# Patient Record
Sex: MALE | Race: BLACK OR AFRICAN AMERICAN | NOT HISPANIC OR LATINO | Employment: FULL TIME | URBAN - METROPOLITAN AREA
[De-identification: names, ages, dates, MRNs, and addresses within clinical notes are randomized per-mention and may not be internally consistent; named-entity substitution may affect disease eponyms.]

---

## 2017-03-21 ENCOUNTER — OFFICE VISIT (OUTPATIENT)
Dept: FAMILY MEDICINE | Facility: CLINIC | Age: 47
End: 2017-03-21
Payer: COMMERCIAL

## 2017-03-21 VITALS
DIASTOLIC BLOOD PRESSURE: 78 MMHG | HEIGHT: 71 IN | HEART RATE: 80 BPM | WEIGHT: 257.69 LBS | BODY MASS INDEX: 36.08 KG/M2 | TEMPERATURE: 98 F | SYSTOLIC BLOOD PRESSURE: 130 MMHG

## 2017-03-21 DIAGNOSIS — Z00.00 ANNUAL PHYSICAL EXAM: Primary | ICD-10-CM

## 2017-03-21 DIAGNOSIS — R10.32 LEFT GROIN PAIN: ICD-10-CM

## 2017-03-21 DIAGNOSIS — Z72.0 TOBACCO USE: ICD-10-CM

## 2017-03-21 DIAGNOSIS — E66.9 OBESITY (BMI 30-39.9): ICD-10-CM

## 2017-03-21 PROCEDURE — 99386 PREV VISIT NEW AGE 40-64: CPT | Mod: S$GLB,,, | Performed by: INTERNAL MEDICINE

## 2017-03-21 PROCEDURE — 99999 PR PBB SHADOW E&M-EST. PATIENT-LVL III: CPT | Mod: PBBFAC,,, | Performed by: INTERNAL MEDICINE

## 2017-03-21 NOTE — PROGRESS NOTES
Subjective:        Patient ID: Sami Benson is a 46 y.o. male.    Chief Complaint: Annual Exam    HPI   Sami Benson presents for annual exam and to establish care.  Pt had labs drawn at an [facility] ~1 month ago; he thinks he has copies of the results at home.    1. L groin/upper leg pain: Pt reports pain in the L groin, upper leg x 6 months.  Pt's last PCP had ordered x-rays, MRI and US at [facility].  Pt reports pain is worse with certain movements (like swinging legs out to get out of the car) so he has learned not to move certain ways.  Pain is also worse at night and it wakes him up from sleep.  Pt endorses leg weakness 2/2 pain.  He feels he cannot put full body weight on that leg due pain and unsteadiness.  Pt denies low back pain, numbness and tingling in the leg.  He denies h/o trauma or fall preceding the onset of pain.  Pt says he was supposed to get a CT scan next but cannot afford the test at this time.  He and his last PCP also discussed possibly seeing a neurologist.    2. Tobacco use: pt smokes 1ppd and is interested in quitting.  He started using a hypnosis jay on his phone 2 weeks ago and reports today he felt like he didn't need to smoke anymore and threw away his cigarettes.  Pt is still interested in the tobacco cessation program.  He has tried quitting in the past using nicotine patches.    Review of Systems   Constitutional: Negative for activity change and unexpected weight change.   HENT: Negative for ear pain, hearing loss, sore throat and trouble swallowing.    Eyes: Positive for visual disturbance (has glasses, doesn't always wear them).   Respiratory: Negative for chest tightness and shortness of breath.    Cardiovascular: Negative for chest pain and leg swelling.   Gastrointestinal: Negative for abdominal pain, blood in stool, constipation and diarrhea.   Genitourinary: Negative for difficulty urinating and hematuria.   Skin: Negative for rash.   Neurological:  Negative for dizziness and light-headedness.   Psychiatric/Behavioral: Negative for dysphoric mood. The patient is not nervous/anxious.            Objective:        Vitals:    03/21/17 0806   BP: 130/78   Pulse: 80   Temp: 98 °F (36.7 °C)     Physical Exam   Constitutional: He is oriented to person, place, and time. He appears well-developed and well-nourished. No distress.   HENT:   Head: Normocephalic and atraumatic.   Right Ear: External ear normal.   Left Ear: External ear normal.   Nose: Nose normal.   Mouth/Throat: Oropharynx is clear and moist.   - bilateral ear canals clear, tympanic membranes visualized - normal color and light reflex   Eyes: Conjunctivae and EOM are normal.   Neck: Normal range of motion. Neck supple.   Cardiovascular: Normal rate, regular rhythm and normal heart sounds.    Pulmonary/Chest: Effort normal and breath sounds normal. No respiratory distress.   Abdominal: Soft. He exhibits no distension. There is no tenderness. There is no guarding.   Musculoskeletal: Normal range of motion. He exhibits no edema or deformity.   Neurological: He is alert and oriented to person, place, and time. He exhibits normal muscle tone. Coordination normal.   - 5/5 strength in b/l LEs  - gait WNL  - pt unsteady standing on L and R feet separately but equal   Skin: Skin is warm and dry.   Psychiatric: He has a normal mood and affect. His behavior is normal. Judgment and thought content normal.   Vitals reviewed.          Assessment:         1. Annual physical exam    2. Tobacco use    3. Left groin pain    4. Obesity (BMI 30-39.9)              Plan:         Sami was seen today for annual exam.    Diagnoses and all orders for this visit:    Annual physical exam  - Will obtain medical records from [facility] for records of imaging and vaccinations  - No famhx of early prostate or colon Rashi  - Pt had screening labs 1 month at health [facility]; asked pt to please drop off a copy of these results when he finds  them.    Tobacco use: Referral to smoking cessation  -     Ambulatory referral to Smoking Cessation Program    Left groin pain: Unclear etiology but likely MSK, possibly bursitis, hip arthritis, tendonitis.  Strength intact on exam and sx not c/w neuropathic etiology.  Will review imaging that has already been completed by previous PCP before ordering further testing.    Obesity (BMI 30-39.9): Stable.        Will contact pt regarding follow up after reviewing outside labs and medical records.

## 2017-03-21 NOTE — MR AVS SNAPSHOT
"    [facility]  [address] [facility], [address]  [place] [address]  Phone: [phone]  Fax: [phone]                  Sami Benson   3/21/2017 8:00 AM   Office Visit    Description:  Male : 1970   Provider:  Cecilia Cuevas MD   Department:  [facility]           Reason for Visit     Annual Exam           Diagnoses this Visit        Comments    Annual physical exam    -  Primary     Tobacco use                To Do List           Goals (5 Years of Data)     None      Follow-Up and Disposition     Return for You will be contacted once we receive your outside medical records and lab results..      [facility] On Call     [facility] On Call Nurse Care Line -  Assistance  Registered nurses in the [facility] On Call Center provide clinical advisement, health education, appointment booking, and other advisory services.  Call for this free service at [phone].             Medications           Message regarding Medications     Verify the changes and/or additions to your medication regime listed below are the same as discussed with your clinician today.  If any of these changes or additions are incorrect, please notify your healthcare provider.        STOP taking these medications     OSELTAMIVIR PHOSPHATE (TAMIFLU ORAL) Take by mouth.           Verify that the below list of medications is an accurate representation of the medications you are currently taking.  If none reported, the list may be blank. If incorrect, please contact your healthcare provider. Carry this list with you in case of emergency.           Current Medications            Clinical Reference Information           Your Vitals Were     BP Pulse Temp Height Weight BMI    130/78 80 98 °F (36.7 °C) (Oral) 5' 11" (1.803 m) 116.9 kg (257 lb 11.5 oz) 35.94 kg/m2      Blood Pressure          Most Recent Value    BP  130/78      Allergies as of 3/21/2017     No Known Allergies      Immunizations " Administered on Date of Encounter - 3/21/2017     None      Orders Placed During Today's Visit      Normal Orders This Visit    Ambulatory referral to Smoking Cessation Program       Smoking Cessation     If you would like to quit smoking:   You may be eligible for free services if you are a [place] resident and started smoking cigarettes before September 1, 1988.  Call the Smoking [employer]essation [employer] ([employer]) toll free at (260) 390-3401 or (147) 785-2135.   Call 8-800-QUIT-NOW if you do not meet the above criteria.            Language Assistance Services     ATTENTION: Language assistance services are available, free of charge. Please call [phone].      ATENCIÓN: Si habla español, tiene a finley disposición servicios gratuitos de asistencia lingüística. Llame al [phone].     CHÚ Ý: N?u b?n nói Ti?ng Vi?t, có các d?ch v? h? tr? ngôn ng? mi?n phí dành cho b?n. G?i s? [phone].         [facility] complies with applicable Federal civil rights laws and does not discriminate on the basis of race, color, national origin, age, disability, or sex.

## 2017-04-19 ENCOUNTER — TELEPHONE (OUTPATIENT)
Dept: FAMILY MEDICINE | Facility: CLINIC | Age: 47
End: 2017-04-19

## 2017-04-19 NOTE — TELEPHONE ENCOUNTER
Notified patient that records have not been received and that request was resent today. Informed patient that he might want to contact them to get his own records.patient verbalizes understanding.

## 2017-04-19 NOTE — TELEPHONE ENCOUNTER
----- Message from Bina Davila sent at 4/19/2017  9:56 AM CDT -----  Contact: pt [phone]  Pt was inquiring if Dr. Cuevas had a chance to go over his Xrays and MRI. pls advise pt

## 2017-05-09 ENCOUNTER — TELEPHONE (OUTPATIENT)
Dept: FAMILY MEDICINE | Facility: CLINIC | Age: 47
End: 2017-05-09

## 2017-05-09 NOTE — TELEPHONE ENCOUNTER
----- Message from Rose Lacy sent at 5/9/2017  9:48 AM CDT -----  Contact: Self/ [phone]   Type: Test Results    What test was performed? MRI     Who ordered the test?    When and where were the test performed?     Comments: pt want to know if the doctor receive his MRI and Xray from the [facility]. Please call and advise     Thank you

## 2017-05-19 ENCOUNTER — TELEPHONE (OUTPATIENT)
Dept: FAMILY MEDICINE | Facility: CLINIC | Age: 47
End: 2017-05-19

## 2017-05-19 NOTE — TELEPHONE ENCOUNTER
----- Message from Kaylyn Sandra sent at 5/19/2017  3:07 PM CDT -----  Contact: Patient, phone [phone]  The patient would like to know if the records have come from [facility].  If not he would like to know if he should take the MRI and x-ray again.  Please give him a call to advise.     Thanks!

## 2017-05-25 ENCOUNTER — TELEPHONE (OUTPATIENT)
Dept: FAMILY MEDICINE | Facility: CLINIC | Age: 47
End: 2017-05-25

## 2017-05-25 DIAGNOSIS — M25.552 PAIN OF LEFT HIP JOINT: Primary | ICD-10-CM

## 2017-05-25 NOTE — TELEPHONE ENCOUNTER
----- Message from Naomi Perkins sent at 5/25/2017 12:43 PM CDT -----  Contact: call pt [phone]  Patient is calling to see if received copies of his x rays and cat scan that were done, he states that he is in a lot of pain and would to speak with you about those results.   I offered to make an appointment for patient to come in to see you he did not want to do that at this time

## 2017-05-25 NOTE — TELEPHONE ENCOUNTER
Called and spoke with patient.  Reviewed [facility] and [facility] records.  Pt had US for L groin/pelvic pain that showed no hernia.  Per physician notes, MRI and ortho referral were recommended next.  Pt did not get MRI due to cost.  Pt reports pain is in the same area but getting more severe.  Will place referral for ortho and defer imaging to them.  Pt will expect call from referral coordinator.

## 2017-06-07 DIAGNOSIS — M25.552 ACUTE HIP PAIN, LEFT: Primary | ICD-10-CM

## 2017-06-08 ENCOUNTER — TELEPHONE (OUTPATIENT)
Dept: ORTHOPEDICS | Facility: CLINIC | Age: 47
End: 2017-06-08

## 2017-06-08 NOTE — TELEPHONE ENCOUNTER
----- Message from Ayaan Benoit sent at 6/7/2017  5:37 PM CDT -----  Contact: pt   Pt would like to reschedule appt he has set for today 6/17.174. Due to unexpected work schedule.     Pt can be reached at [phone].

## 2018-10-11 ENCOUNTER — OFFICE VISIT (OUTPATIENT)
Dept: INTERNAL MEDICINE | Facility: CLINIC | Age: 48
End: 2018-10-11
Payer: COMMERCIAL

## 2018-10-11 ENCOUNTER — HOSPITAL ENCOUNTER (EMERGENCY)
Facility: HOSPITAL | Age: 48
Discharge: PSYCHIATRIC OR SUBSTANCE USE FACILITY | End: 2018-10-12
Attending: EMERGENCY MEDICINE
Payer: COMMERCIAL

## 2018-10-11 VITALS
WEIGHT: 201.94 LBS | SYSTOLIC BLOOD PRESSURE: 118 MMHG | HEIGHT: 71 IN | DIASTOLIC BLOOD PRESSURE: 66 MMHG | BODY MASS INDEX: 28.27 KG/M2 | HEART RATE: 92 BPM | OXYGEN SATURATION: 99 %

## 2018-10-11 DIAGNOSIS — F32.A DEPRESSION, UNSPECIFIED DEPRESSION TYPE: Primary | ICD-10-CM

## 2018-10-11 DIAGNOSIS — F32.2 CURRENT SEVERE EPISODE OF MAJOR DEPRESSIVE DISORDER WITHOUT PSYCHOTIC FEATURES WITHOUT PRIOR EPISODE: Primary | ICD-10-CM

## 2018-10-11 DIAGNOSIS — F14.10 COCAINE USE DISORDER: ICD-10-CM

## 2018-10-11 DIAGNOSIS — F10.10 ETOH ABUSE: ICD-10-CM

## 2018-10-11 DIAGNOSIS — R45.851 SUICIDAL IDEATIONS: ICD-10-CM

## 2018-10-11 LAB
ALBUMIN SERPL BCP-MCNC: 3.6 G/DL
ALP SERPL-CCNC: 116 U/L
ALT SERPL W/O P-5'-P-CCNC: 10 U/L
ANION GAP SERPL CALC-SCNC: 13 MMOL/L
APAP SERPL-MCNC: <3 UG/ML
AST SERPL-CCNC: 13 U/L
BASOPHILS # BLD AUTO: 0.06 K/UL
BASOPHILS NFR BLD: 0.4 %
BILIRUB SERPL-MCNC: 0.5 MG/DL
BUN SERPL-MCNC: 8 MG/DL
CALCIUM SERPL-MCNC: 9.5 MG/DL
CHLORIDE SERPL-SCNC: 105 MMOL/L
CO2 SERPL-SCNC: 21 MMOL/L
CREAT SERPL-MCNC: 1.3 MG/DL
DIFFERENTIAL METHOD: ABNORMAL
EOSINOPHIL # BLD AUTO: 0.1 K/UL
EOSINOPHIL NFR BLD: 0.9 %
ERYTHROCYTE [DISTWIDTH] IN BLOOD BY AUTOMATED COUNT: 13.8 %
EST. GFR  (AFRICAN AMERICAN): >60 ML/MIN/1.73 M^2
EST. GFR  (NON AFRICAN AMERICAN): >60 ML/MIN/1.73 M^2
ETHANOL SERPL-MCNC: <10 MG/DL
GLUCOSE SERPL-MCNC: 123 MG/DL
HCT VFR BLD AUTO: 54.6 %
HGB BLD-MCNC: 17.7 G/DL
IMM GRANULOCYTES # BLD AUTO: 0.06 K/UL
IMM GRANULOCYTES NFR BLD AUTO: 0.4 %
LITHIUM SERPL-SCNC: <0.1 MMOL/L
LYMPHOCYTES # BLD AUTO: 4.9 K/UL
LYMPHOCYTES NFR BLD: 35.2 %
MCH RBC QN AUTO: 31.7 PG
MCHC RBC AUTO-ENTMCNC: 32.4 G/DL
MCV RBC AUTO: 98 FL
MONOCYTES # BLD AUTO: 1 K/UL
MONOCYTES NFR BLD: 6.9 %
NEUTROPHILS # BLD AUTO: 7.8 K/UL
NEUTROPHILS NFR BLD: 56.2 %
NRBC BLD-RTO: 0 /100 WBC
PLATELET # BLD AUTO: 429 K/UL
PMV BLD AUTO: 9.6 FL
POTASSIUM SERPL-SCNC: 4 MMOL/L
PROT SERPL-MCNC: 7.3 G/DL
RBC # BLD AUTO: 5.58 M/UL
SALICYLATES SERPL-MCNC: <5 MG/DL
SODIUM SERPL-SCNC: 139 MMOL/L
TSH SERPL DL<=0.005 MIU/L-ACNC: 1.16 UIU/ML
WBC # BLD AUTO: 13.96 K/UL

## 2018-10-11 PROCEDURE — 80320 DRUG SCREEN QUANTALCOHOLS: CPT

## 2018-10-11 PROCEDURE — 80307 DRUG TEST PRSMV CHEM ANLYZR: CPT

## 2018-10-11 PROCEDURE — 99283 EMERGENCY DEPT VISIT LOW MDM: CPT | Mod: ,,, | Performed by: EMERGENCY MEDICINE

## 2018-10-11 PROCEDURE — 99213 OFFICE O/P EST LOW 20 MIN: CPT | Mod: S$GLB,,, | Performed by: NURSE PRACTITIONER

## 2018-10-11 PROCEDURE — 80178 ASSAY OF LITHIUM: CPT

## 2018-10-11 PROCEDURE — 85025 COMPLETE CBC W/AUTO DIFF WBC: CPT

## 2018-10-11 PROCEDURE — 84443 ASSAY THYROID STIM HORMONE: CPT

## 2018-10-11 PROCEDURE — 99285 EMERGENCY DEPT VISIT HI MDM: CPT

## 2018-10-11 PROCEDURE — 80329 ANALGESICS NON-OPIOID 1 OR 2: CPT

## 2018-10-11 PROCEDURE — 99999 PR PBB SHADOW E&M-EST. PATIENT-LVL III: CPT | Mod: PBBFAC,,, | Performed by: NURSE PRACTITIONER

## 2018-10-11 PROCEDURE — 80053 COMPREHEN METABOLIC PANEL: CPT

## 2018-10-11 PROCEDURE — 3008F BODY MASS INDEX DOCD: CPT | Mod: CPTII,S$GLB,, | Performed by: NURSE PRACTITIONER

## 2018-10-11 NOTE — ED NOTES
Bed: [facility] 01  Expected date:   Expected time:   Means of arrival:   Comments:

## 2018-10-11 NOTE — ED TRIAGE NOTES
"Pt. Presents to ED today with c/o depression over the last few months, states seeing psychiatrist at, unable to obtain meds for depression. Pt. States intermittent SI, not HI, denies plan, states "I would never act on it". Pt. Irritable, agitated, states not wanting to be committed. Denies other complaints or pain at this time, +ambulatory with steady gait, a&ox4.   "

## 2018-10-11 NOTE — ED PROVIDER NOTES
Encounter Date: 10/11/2018    SCRIBE #1 NOTE: I, Mayra Cueto, am scribing for, and in the presence of,  Dr. Gutierrez. I have scribed the entire note.       History     Chief Complaint   Patient presents with    Psychiatric Evaluation     sent Im clinic depression     PATIENT WENT TO NP TODAY AND WAS DIAGNOSED WITH CLINICAL DEPRESSION.   WIFE STATES THAT PT WAS CRYING WHILE IN IM CLINIC.   DENIES SI/HI.       The history is provided by the patient.     Review of patient's allergies indicates:  No Known Allergies  Past Medical History:   Diagnosis Date    Obesity (BMI 30-39.9) 3/21/2017    Tobacco use 3/21/2017     No past surgical history on file.  Family History   Problem Relation Age of Onset    Colon cancer Neg Hx     Prostate cancer Neg Hx      Social History     Tobacco Use    Smoking status: Current Every Day Smoker     Packs/day: 1.00     Years: 30.00     Pack years: 30.00    Smokeless tobacco: Never Used    Tobacco comment: 1 ppd   Substance Use Topics    Alcohol use: Yes     Alcohol/week: 3.0 oz     Types: 5 Cans of beer per week     Comment: 5-6 cans per day    Drug use: Yes     Types: Marijuana, Cocaine     Review of Systems   Constitutional: Negative for fever.   Psychiatric/Behavioral: Positive for dysphoric mood. Negative for suicidal ideas.        (-) HI   All other systems reviewed and are negative.      Physical Exam     Initial Vitals [10/11/18 1633]   BP Pulse Resp Temp SpO2   127/85 77 18 99.1 °F (37.3 °C) 96 %      MAP       --         Physical Exam    Vitals reviewed.  Constitutional: He appears well-developed and well-nourished.   HENT:   Head: Atraumatic.   Eyes: EOM are normal.   Neck: Neck supple.   Cardiovascular: Normal rate.   Pulmonary/Chest: Breath sounds normal. No respiratory distress.   Abdominal: Soft. There is no tenderness.   Neurological: He is alert and oriented to person, place, and time.   Skin: Skin is warm and dry.   Psychiatric:   FLAT AFFECT  NON PRESSURED  SENTENCES  VERBAL OUTBURSTS  NO HALLUCINATIONS        DISCUSSED WITH FAMILY BEDSIDE, THEY DID NOT KNOW WHAT'S GOING ON         ED Course   Procedures  Labs Reviewed   CBC W/ AUTO DIFFERENTIAL   COMPREHENSIVE METABOLIC PANEL   TSH   URINALYSIS, REFLEX TO URINE CULTURE   DRUG SCREEN PANEL, URINE EMERGENCY   ALCOHOL,MEDICAL (ETHANOL)   ACETAMINOPHEN LEVEL   SALICYLATE LEVEL   LITHIUM LEVEL          Imaging Results    None          Medical Decision Making:   History:   Old Medical Records: I decided to obtain old medical records.  Clinical Tests:   Lab Tests: Ordered and Reviewed  ED Management:  1806  Spoke with Dr. ARCHIBALD who will assess pt in ED,  NO MALE PSYCH BEDS AVAILABLE WILL NEED TRANSFER  2010 NO SIGNS OF ACUTE MEDICAL EMERGENCY AND COULD BE DISPOSITION FOR PSYCHIATRIC EVALUATION  PHYSICIAN'S EMERGENCY CERTIFICATE IN CHART SIGN BY ME  2050 D/W  [facility] , DOES NOT RECOMMEND MEDICATION CURRENTLY AND WILL TRANSFER TO A PSYCHIATRIC FACILITY  Other:   I have discussed this case with another health care provider.            Scribe Attestation:   Scribe #1: I performed the above scribed service and the documentation accurately describes the services I performed. I attest to the accuracy of the note.               Clinical Impression:    DEPRESSION WITH EMOTIONAL OUTBURSTS                             Nando Gutierrez, DO  10/11/18 2019       Nando Gutierrez,   10/11/18 2053

## 2018-10-11 NOTE — PROGRESS NOTES
Subjective:       Patient ID: Sami Benson is a 47 y.o. male.    Chief Complaint: Depression    Pt of Dr. Shaw, here for complaint of depression. Has been going on for about 11 months since his wife put him out of her home for using drugs and ETOH, and since then he has not seen his children. He was at the time using cocaine and went through outpatient tx in the past. He has used cocaine in the past month and a half and has increased use of ETOH, drinking 5-6 beers a day.      Depression   Visit Type: initial  Onset of symptoms: 1 to 6 months ago  Progression since onset: gradually worsening  Patient presents with the following symptoms: decreased concentration, depressed mood, feelings of hopelessness, feelings of worthlessness, insomnia, irritability, nervousness/anxiety, restlessness, suicidal ideas and thoughts of death.  Patient is not experiencing: chest pain, confusion, dizziness, fatigue, hypersomnia, memory impairment, palpitations, panic, shortness of breath and suicidal planning.  Frequency of symptoms: constantly   Severity: interfering with daily activities   Aggravated by: family issues  Sleep quality: poor  Nighttime awakenings: many  Risk factors: alcohol intake, illicit drug use, major life event and marital problems  Treatment tried: individual therapy (has gone to therapy 5 times. In the past admits going to outpatient rehab for cocaine abuse)  Compliance with treatment: poor  Improvement on treatment: no relief        Review of Systems   Constitutional: Positive for activity change, appetite change, fatigue and irritability. Negative for chills and unexpected weight change.   Respiratory: Negative for chest tightness and shortness of breath.    Cardiovascular: Negative for chest pain, palpitations and leg swelling.   Gastrointestinal: Negative for abdominal pain, constipation, diarrhea, nausea and vomiting.   Endocrine: Negative for cold intolerance, heat intolerance, polydipsia and  polyphagia.   Genitourinary: Negative for dysuria.   Musculoskeletal: Negative for arthralgias.   Skin: Negative for color change, pallor and rash.   Allergic/Immunologic: Negative for environmental allergies, food allergies and immunocompromised state.   Neurological: Negative for dizziness, weakness, light-headedness, numbness and headaches.   Hematological: Negative for adenopathy. Does not bruise/bleed easily.   Psychiatric/Behavioral: Positive for agitation, decreased concentration, depression, dysphoric mood, sleep disturbance and suicidal ideas. Negative for behavioral problems, confusion, hallucinations and self-injury. The patient is nervous/anxious and has insomnia. The patient is not hyperactive.         As documented in HPI         Review of patient's allergies indicates:  No Known Allergies    No current outpatient medications on file.    Patient Active Problem List   Diagnosis    Obstructive sleep apnea    Tobacco use    Left groin pain    Obesity (BMI 30-39.9)    Cocaine use disorder    ETOH abuse    Suicidal ideations    Current severe episode of major depressive disorder without psychotic features without prior episode     Past Medical History:   Diagnosis Date    Obesity (BMI 30-39.9) 3/21/2017    Tobacco use 3/21/2017     History reviewed. No pertinent surgical history.    Social History     Socioeconomic History    Marital status:      Spouse name: None    Number of children: None    Years of education: None    Highest education level: None   Social Needs    Financial resource strain: None    Food insecurity - worry: None    Food insecurity - inability: None    Transportation needs - medical: None    Transportation needs - non-medical: None   Occupational History    None   Tobacco Use    Smoking status: Current Every Day Smoker     Packs/day: 1.00     Years: 30.00     Pack years: 30.00    Smokeless tobacco: Never Used    Tobacco comment: 1 ppd   Substance and Sexual  "Activity    Alcohol use: Yes     Alcohol/week: 3.0 oz     Types: 5 Cans of beer per week     Comment: 5-6 cans per day    Drug use: Yes     Types: Marijuana, Cocaine    Sexual activity: No   Other Topics Concern    None   Social History Narrative    Drives for [employer]    ; daughters     Family History   Problem Relation Age of Onset    Colon cancer Neg Hx     Prostate cancer Neg Hx        Objective:       Vitals:    10/11/18 1546   BP: 118/66   Pulse: 92   SpO2: 99%   Weight: 91.6 kg (201 lb 15.1 oz)   Height: 5' 11" (1.803 m)   PainSc: 10-Worst pain ever   PainLoc: Generalized       Body mass index is 28.17 kg/m².    Physical Exam   Constitutional: He is oriented to person, place, and time. He appears well-developed and well-nourished.   HENT:   Head: Normocephalic.   Eyes: EOM and lids are normal. Pupils are equal, round, and reactive to light. Lids are everted and swept, no foreign bodies found.   Eyes blood shot red   Neck: Trachea normal, normal range of motion and full passive range of motion without pain. Neck supple. No JVD present. Carotid bruit is not present.   Cardiovascular: Normal rate, regular rhythm, normal heart sounds, intact distal pulses and normal pulses.   Pulmonary/Chest: Effort normal and breath sounds normal.   Abdominal: Soft. Normal appearance and bowel sounds are normal. There is no hepatosplenomegaly. There is no CVA tenderness.   Musculoskeletal: Normal range of motion.   Neurological: He is alert and oriented to person, place, and time.   Skin: Skin is warm, dry and intact. Capillary refill takes less than 2 seconds.   Psychiatric: His speech is normal. His affect is labile. He is withdrawn. Thought content is not paranoid and not delusional. Cognition and memory are normal. He exhibits a depressed mood. He expresses suicidal ideation. He expresses no homicidal ideation. He expresses no suicidal plans and no homicidal plans.   PHQ-9 score positive at 24 with suicidal " thoughts    Tearful throughout interview   Nursing note and vitals reviewed.      Depression Patient Health Questionnaire 10/11/2018   In the last two weeks how often have you had little interest or pleasure in doing things 3   In the last two weeks how often have you felt down, depressed or hopeless 3   PHQ-2 Total Score 6   In the last two weeks how often have you had trouble falling or staying asleep, or sleeping too much 3   In the last two weeks how often have you felt tired or having little energy 3   In the last two weeks how often have you had a poor appetite or overeating 3   In the last two weeks how often have you felt bad about yourself - or that you are a failure or have let yourself or your family down 3   In the last two weeks how often have you had trouble concentrating on things, such as reading the newspaper or watching television 3   In the last two weeks how often have you been moving or speaking so slowly that other people could have noticed. Or the opposite - being so fidgety or restless that you have been moving around a lot more than usual. 0   In the last two weeks how often have you had thoughts that you would be better off dead, or of hurting yourself 3   If you checked off any problems, how difficult have these problems made it for you to do your work, take care of things at home or get along with other people? Extremely difficult   Total Score 24         Assessment:       1. Current severe episode of major depressive disorder without psychotic features without prior episode    2. Suicidal ideations    3. Cocaine use disorder    4. ETOH abuse    5. BMI 28.0-28.9,adult        Plan:       Sami was seen today for depression.    Diagnoses and all orders for this visit:    Current severe episode of major depressive disorder without psychotic features without prior episode  -     Refer to Emergency Dept.    Suicidal ideations  -     Refer to Emergency Dept.    Cocaine use disorder  -     Refer  to Emergency Dept.    ETOH abuse  -     Refer to Emergency Dept.    BMI 28.0-28.9,adult  BMI reviewed    Pt referred to ED for Psych eval stat due to suicidal thoughts and positive PHQ-9 depression screening of 24 with recurrent cocaine and ETOH abuse. He is agreeable to this. His 2 sisters are with him and will transport him. Needs Psych eval ASAP.

## 2018-10-11 NOTE — PATIENT INSTRUCTIONS
Pt referred to ED for Psych eval stat due to suicidal thoughts and positive PHQ-9 depression screening of 24 with recurrent cocaine and ETOH abuse. He is agreeable to this. His 2 sisters are with him and will transport him. Needs Psych eval ASAP.

## 2018-10-12 VITALS
SYSTOLIC BLOOD PRESSURE: 130 MMHG | DIASTOLIC BLOOD PRESSURE: 72 MMHG | RESPIRATION RATE: 17 BRPM | HEART RATE: 77 BPM | OXYGEN SATURATION: 100 % | WEIGHT: 201 LBS | TEMPERATURE: 99 F | HEIGHT: 72 IN | BODY MASS INDEX: 27.22 KG/M2

## 2018-10-12 PROBLEM — F32.A DEPRESSION WITH SUICIDAL IDEATION: Status: ACTIVE | Noted: 2018-10-12

## 2018-10-12 PROBLEM — F12.20 CANNABIS DEPENDENCE, CONTINUOUS: Status: ACTIVE | Noted: 2018-10-12

## 2018-10-12 PROBLEM — R45.851 DEPRESSION WITH SUICIDAL IDEATION: Status: ACTIVE | Noted: 2018-10-12

## 2018-10-12 PROBLEM — R03.0 ELEVATED BLOOD PRESSURE READING WITHOUT DIAGNOSIS OF HYPERTENSION: Status: ACTIVE | Noted: 2018-10-12

## 2018-10-12 LAB
AMPHET+METHAMPHET UR QL: NEGATIVE
BARBITURATES UR QL SCN>200 NG/ML: NEGATIVE
BENZODIAZ UR QL SCN>200 NG/ML: NEGATIVE
BILIRUB UR QL STRIP: NEGATIVE
BZE UR QL SCN: NORMAL
CANNABINOIDS UR QL SCN: NORMAL
CLARITY UR REFRACT.AUTO: CLEAR
COLOR UR AUTO: YELLOW
CREAT UR-MCNC: 210 MG/DL
GLUCOSE UR QL STRIP: NEGATIVE
HGB UR QL STRIP: NEGATIVE
KETONES UR QL STRIP: NEGATIVE
LEUKOCYTE ESTERASE UR QL STRIP: NEGATIVE
METHADONE UR QL SCN>300 NG/ML: NEGATIVE
NITRITE UR QL STRIP: NEGATIVE
OPIATES UR QL SCN: NEGATIVE
PCP UR QL SCN>25 NG/ML: NEGATIVE
PH UR STRIP: 7 [PH] (ref 5–8)
PROT UR QL STRIP: NEGATIVE
SP GR UR STRIP: 1.02 (ref 1–1.03)
TOXICOLOGY INFORMATION: NORMAL
URN SPEC COLLECT METH UR: NORMAL
UROBILINOGEN UR STRIP-ACNC: 2 EU/DL

## 2018-10-12 PROCEDURE — 81003 URINALYSIS AUTO W/O SCOPE: CPT | Mod: 59

## 2018-10-12 PROCEDURE — 80307 DRUG TEST PRSMV CHEM ANLYZR: CPT

## 2018-10-12 NOTE — SUBJECTIVE & OBJECTIVE
Patient History           Medical as of 10/11/2018     Past Medical History     Diagnosis Date Comments Source    Obesity (BMI 30-39.9) 3/21/2017 -- Provider    Tobacco use 3/21/2017 -- Provider          Pertinent Negatives     Diagnosis Date Noted Comments Source    Asthma 12/17/2014 -- Provider                  Surgical as of 10/11/2018    Past Surgical History: Patient provided no pertinent surgical history.           Family as of 10/11/2018     Problem Relation Name Age of Onset Comments Source    Colon cancer Neg Hx -- -- -- Provider    Prostate cancer Neg Hx -- -- -- Provider            Tobacco Use as of 10/11/2018     Smoking Status Smoking Start Date Smoking Quit Date Packs/Day Years Used    Current Every Day Smoker -- -- 1.00 30.00    Types Comments Smokeless Tobacco Status Smokeless Tobacco Quit Date Source    -- 1 ppd Never Used -- Provider            Alcohol Use as of 10/11/2018     Alcohol Use Drinks/Week Alcohol/Week Comments Source    Yes 5 Cans of beer 3.0 oz 5-6 cans per day Provider    Frequency Standard Drinks Binge Drinking Source      -- -- -- Provider             Drug Use as of 10/11/2018     Drug Use Types Frequency Comments Source    Yes  Marijuana, Cocaine -- -- Provider            Sexual Activity as of 10/11/2018     Sexually Active Birth Control Partners Comments Source    No -- -- -- Provider            Activities of Daily Living as of 10/11/2018    None           Social Documentation as of 10/11/2018    Drives for [employer]  ; daughters  Source: Provider           Occupational as of 10/11/2018    None           Socioeconomic as of 10/11/2018     Marital Status Spouse Name Number of Children Years Education Education Level Preferred Language Ethnicity Race Source     -- -- -- -- English /Black Black or  Provider    Financial Resource Strain Food Insecurity: Worry Food Insecurity: Inability Transportation Needs: Medical Transportation  Needs: Non-medical       -- -- -- -- --             Pertinent History     Question Response Comments    Lives with -- --    Place in Birth Order -- --    Lives in -- --    Number of Siblings -- --    Raised by -- --    Legal Involvement -- --    Childhood Trauma -- --    Criminal History of -- --    Financial Status -- --    Highest Level of Education -- --    Does patient have access to a firearm? -- --     Service -- --    Primary Leisure Activity -- --    Spirituality -- --        Past Medical History:   Diagnosis Date    Obesity (BMI 30-39.9) 3/21/2017    Tobacco use 3/21/2017     No past surgical history on file.  Family History     None        Tobacco Use    Smoking status: Current Every Day Smoker     Packs/day: 1.00     Years: 30.00     Pack years: 30.00    Smokeless tobacco: Never Used    Tobacco comment: 1 ppd   Substance and Sexual Activity    Alcohol use: Yes     Alcohol/week: 3.0 oz     Types: 5 Cans of beer per week     Comment: 5-6 cans per day    Drug use: Yes     Types: Marijuana, Cocaine    Sexual activity: No     Review of patient's allergies indicates:  No Known Allergies    No current facility-administered medications on file prior to encounter.      No current outpatient medications on file prior to encounter.     Psychotherapeutics (From admission, onward)    None        Review of Systems  Strengths and Liabilities: Strength: Patient is expressive/articulate., Strength: Patient is intelligent., Strength: Patient is motivated for change., Liability: Patient has no suport network., Liability: Patient lacks coping skills.    Objective:     Vital Signs (Most Recent):  Temp: 99.1 °F (37.3 °C) (10/11/18 1633)  Pulse: 77 (10/11/18 1633)  Resp: 18 (10/11/18 1633)  BP: 127/85 (10/11/18 1633)  SpO2: 96 % (10/11/18 1633) Vital Signs (24h Range):  Temp:  [99.1 °F (37.3 °C)] 99.1 °F (37.3 °C)  Pulse:  [77-92] 77  Resp:  [18] 18  SpO2:  [96 %-99 %] 96 %  BP: (118-127)/(66-85) 127/85  "    Height: 5' 11.5" (181.6 cm)  Weight: 91.2 kg (201 lb)  Body mass index is 27.64 kg/m².    No intake or output data in the 24 hours ending 10/11/18 2104    Physical Exam   Psychiatric:   Mental Status Exam:  Appearance: unremarkable, age appropriate, normal weight, lying in bed  Level of Consciousness: awake and alert  Behavior/Cooperation: friendly and cooperative  Psychomotor: unremarkable   Speech: normal tone, normal rate, normal pitch, normal volume  Language: english, fluid  Orientation: person, place, situation, time/date, day of week  Attention Span/Concentration: spelled "HOUSE" forwards and backwards  Memory: Registers and recalls 3/3 objects at 1 and 5 minutes  Mood: "relaxed"  Affect: euthymic and mood-congruent  Thought Process: linear, goal-directed  Associations: normal and logical  Thought Content: endorses passive SI  Fund of Knowledge: Aware of current events  Abstraction: similarities were abstract  Insight: fair  Judgment: fair          Significant Labs:   Last 24 Hours:   Recent Lab Results       10/11/18  1727      Immature Granulocytes 0.4     Immature Grans (Abs) 0.06  Comment:  Mild elevation in immature granulocytes is non specific and   can be seen in a variety of conditions including stress response,   acute inflammation, trauma and pregnancy. Correlation with other   laboratory and clinical findings is essential.       Acetaminophen (Tylenol), Serum <3.0  Comment:  Toxic Levels:  Adults (4 hr post-ingestion).........>150 ug/mL  Adults (12 hr post-ingestion)........>40 ug/mL  Peds (2 hr post-ingestion, liquid)...>225 ug/mL       Albumin 3.6     Alcohol, Medical, Serum <10     Alkaline Phosphatase 116     ALT 10     Anion Gap 13     AST 13     Baso # 0.06     Basophil% 0.4     Total Bilirubin 0.5  Comment:  For infants and newborns, interpretation of results should be based  on gestational age, weight and in agreement with clinical  observations.  Premature Infant recommended reference " ranges:  Up to 24 hours.............<8.0 mg/dL  Up to 48 hours............<12.0 mg/dL  3-5 days..................<15.0 mg/dL  6-29 days.................<15.0 mg/dL       BUN, Bld 8     Calcium 9.5     Chloride 105     CO2 21     Creatinine 1.3     Differential Method Automated     eGFR if African American >60.0     eGFR if non  >60.0  Comment:  Calculation used to obtain the estimated glomerular filtration  rate (eGFR) is the CKD-EPI equation.        Eos # 0.1     Eosinophil% 0.9     Glucose 123     Gran # (ANC) 7.8     Gran% 56.2     Hematocrit 54.6     Hemoglobin 17.7     Lithium Lvl <0.1     Lymph # 4.9     Lymph% 35.2     MCH 31.7     MCHC 32.4     MCV 98     Mono # 1.0     Mono% 6.9     MPV 9.6     nRBC 0     Platelets 429     Potassium 4.0     Total Protein 7.3     RBC 5.58     RDW 13.8     Salicylate Lvl <5.0  Comment:  Toxic:  30.0 - 70.0 mg/dl  Lethal: >70.0 mg/dl       Sodium 139     TSH 1.158     WBC 13.96           Significant Imaging: I have reviewed all pertinent imaging results/findings within the past 24 hours.

## 2018-10-12 NOTE — ED NOTES
Pt continues to yell and use profanity despite being redirected multiple times; requested security to stay near room as pt's behavior seems to be worsening; MD johnson

## 2018-10-12 NOTE — ED NOTES
Pt continues to use profanity and yelling despite being redirected; pt ambulated to restroom by sitter, without permission from nurse, where urine was flushed down the toilet; pt informed of need for urine sample and refused; charge nurse aware    MD aware of pt not being eligible for transfer until urine sample has been evaluated

## 2018-10-12 NOTE — CONSULTS
"[facility]  Psychiatry  Consult Note    Patient Name: Sami Benson  MRN: [id number]   Code Status: No Order  Admission Date: 10/11/2018  Hospital Length of Stay: 0 days  Attending Physician: Nando Gutierrez DO  Primary Care Provider: Cecilia Shaw MD    Current Legal Status: [facility]    Patient information was obtained from patient, past medical records and ER records.   Consults  Subjective:     Principal Problem: depression and suicidal ideation    Chief Complaint:  "depression"     HPI:   Per Primary MD:  PATIENT WENT TO NP TODAY AND WAS DIAGNOSED WITH CLINICAL DEPRESSION.   WIFE STATES THAT PT WAS CRYING WHILE IN IM CLINIC.   DENIES SI/HI.     Per C-L MD:   Sami Benson is a 47 y.o. male with no reportd past psychiatric history, currently presenting with <principal problem not specified>.  Psychiatry was originally consulted to address the patient's symptoms of suicidal ideation, substance use, and depression.     Pt presents to [facility] ED on recommendation from his primary care provider for evaluation of depression and suicidal ideation.  Pt reports that he has been having difficulty with depression since being kicked out of the residence her formerly shared with his wife and 2 children.  He reports that he was kicked out due to his substance use (alcohol, cocaine).  He reports that he has been living in a house alone over the past 11 months.  He notes that he initially was motivated to abstain from substance use and change his behavior so that his wife would allow him to return.  He reports period of sobriety for 10 months after eviction but says that, 1.5 months ago, was frustrated about his wife not allowing him to return and says "I chose to start drinking again."  He reports worsening depression over th past month associated with difficulty with sleep, energy, appetite, and concentration.  He reports hopelessness about the potential to modify his current situation.  He " "endorses thinking about suicide "regularly," denies ever thinking of a plan.  He reports that he has limited social supports.  He notes no coping skills other than substance use.  He denies that he currently takes any scheduled medications, denies follow-up with mental health provider, denies history of hospitalizations, and denies history of suicide attempts. He says that he needs assistance with "getting on some medications," not amenable to admission at this time.    Collateral:   Attempted to call pt's wife (Krissy [phone]), no answer, unable to leave voicemail.    SUBJECTIVE   Currently, the patient is endorsing the following:    Medical Review Of Systems:  All systems reviewed and are negative except as above noted in HPI and for hip pain.    Psychiatric Review Of Systems - Is patient experiencing or having changes in:  sleep: yes  appetite: yes  weight: no  energy/anergy: yes  interest/pleasure/anhedonia: yes  somatic symptoms: no  guilty/hopelessness: yes  concentration: yes  S.I.B.s/risky behavior: no  SI/SA:  nyes    anxiety/panic: yes  Agoraphobia:  no  Social phobia:  no  Recurrent nightmares:  no  hyper startle response:  no  Avoidance: no  Recurrent thoughts:  no  Recurrent behaviors:  no    Irritability: no  Racing thoughts: no  Impulsive behaviors: no  Pressured speech:  no    Paranoia:no  Delusions: no  AVH:no    Past Medical/Surgical History:  Past Medical History:   Diagnosis Date    Obesity (BMI 30-39.9) 3/21/2017    Tobacco use 3/21/2017      has a past medical history of Obesity (BMI 30-39.9) (3/21/2017) and Tobacco use (3/21/2017).  No past surgical history on file.    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[employer], unable to " "maintain regular employment  Education: college graduate  Special Ed: no  Housing Status: lives alone in house  History of phys/sexual abuse: no  Access to gun: no    Substance Abuse History:  Recreational Drugs: cocaine regularly ("a few times a week"), endorses trying "everything but heroin"  Use of Alcohol: heavy, denies history of withdrawals, denies current withdrawal symptoms  Rehab History:yes, 3yrs ago at [place], 10 months ago at [place]   Tobacco Use:yes, 1ppd  Legal consequences of chemical use: no  Is the patient aware of the biomedical complications associated with substance abuse and mental illness? no    Legal History:  Past Charges/Incarcerations:yes, arrested in college for public intoxication and interfering with police investigation  Pending charges:no     Family Psychiatric History:   Uncle- prolonged psychiatric hospitalization for unknown reason    Psychosocial Stressors: marital and drug and alcohol.   Functioning Relationships: alone & isolated    Psychosocial Factors:  Maladaptive or problem behaviors: substance use (cocaine and alcohol)  Peer group, social, ethic, cultural, emotional, and health factors: alone and isolated from social supports  Living situation, family constellation, family circumstances/home: living alone in a house,  from his wife and children  Recovery environment: home  Community resources used by patient: two prior rehab stays, none currently  Treatment acceptance/motivation for change: motivated      Hospital Course: No notes on file         Patient History           Medical as of 10/11/2018     Past Medical History     Diagnosis Date Comments Source    Obesity (BMI 30-39.9) 3/21/2017 -- Provider    Tobacco use 3/21/2017 -- Provider          Pertinent Negatives     Diagnosis Date Noted Comments Source    Asthma 12/17/2014 -- Provider                  Surgical as of 10/11/2018    Past Surgical History: Patient provided no pertinent surgical history.         "   Family as of 10/11/2018     Problem Relation Name Age of Onset Comments Source    Colon cancer Neg Hx -- -- -- Provider    Prostate cancer Neg Hx -- -- -- Provider            Tobacco Use as of 10/11/2018     Smoking Status Smoking Start Date Smoking Quit Date Packs/Day Years Used    Current Every Day Smoker -- -- 1.00 30.00    Types Comments Smokeless Tobacco Status Smokeless Tobacco Quit Date Source    -- 1 ppd Never Used -- Provider            Alcohol Use as of 10/11/2018     Alcohol Use Drinks/Week Alcohol/Week Comments Source    Yes 5 Cans of beer 3.0 oz 5-6 cans per day Provider    Frequency Standard Drinks Binge Drinking Source      -- -- -- Provider             Drug Use as of 10/11/2018     Drug Use Types Frequency Comments Source    Yes  Marijuana, Cocaine -- -- Provider            Sexual Activity as of 10/11/2018     Sexually Active Birth Control Partners Comments Source    No -- -- -- Provider            Activities of Daily Living as of 10/11/2018    None           Social Documentation as of 10/11/2018    Drives for [employer]  ; daughters  Source: Provider           Occupational as of 10/11/2018    None           Socioeconomic as of 10/11/2018     Marital Status Spouse Name Number of Children Years Education Education Level Preferred Language Ethnicity Race Source     -- -- -- -- English /Black Black or  Provider    Financial Resource Strain Food Insecurity: Worry Food Insecurity: Inability Transportation Needs: Medical Transportation Needs: Non-medical       -- -- -- -- --             Pertinent History     Question Response Comments    Lives with -- --    Place in Birth Order -- --    Lives in -- --    Number of Siblings -- --    Raised by -- --    Legal Involvement -- --    Childhood Trauma -- --    Criminal History of -- --    Financial Status -- --    Highest Level of Education -- --    Does patient have access to a firearm? -- --     Service --  "--    Primary Leisure Activity -- --    Spirituality -- --        Past Medical History:   Diagnosis Date    Obesity (BMI 30-39.9) 3/21/2017    Tobacco use 3/21/2017     No past surgical history on file.  Family History     None        Tobacco Use    Smoking status: Current Every Day Smoker     Packs/day: 1.00     Years: 30.00     Pack years: 30.00    Smokeless tobacco: Never Used    Tobacco comment: 1 ppd   Substance and Sexual Activity    Alcohol use: Yes     Alcohol/week: 3.0 oz     Types: 5 Cans of beer per week     Comment: 5-6 cans per day    Drug use: Yes     Types: Marijuana, Cocaine    Sexual activity: No     Review of patient's allergies indicates:  No Known Allergies    No current facility-administered medications on file prior to encounter.      No current outpatient medications on file prior to encounter.     Psychotherapeutics (From admission, onward)    None        Review of Systems  Strengths and Liabilities: Strength: Patient is expressive/articulate., Strength: Patient is intelligent., Strength: Patient is motivated for change., Liability: Patient has no suport network., Liability: Patient lacks coping skills.    Objective:     Vital Signs (Most Recent):  Temp: 99.1 °F (37.3 °C) (10/11/18 1633)  Pulse: 77 (10/11/18 1633)  Resp: 18 (10/11/18 1633)  BP: 127/85 (10/11/18 1633)  SpO2: 96 % (10/11/18 1633) Vital Signs (24h Range):  Temp:  [99.1 °F (37.3 °C)] 99.1 °F (37.3 °C)  Pulse:  [77-92] 77  Resp:  [18] 18  SpO2:  [96 %-99 %] 96 %  BP: (118-127)/(66-85) 127/85     Height: 5' 11.5" (181.6 cm)  Weight: 91.2 kg (201 lb)  Body mass index is 27.64 kg/m².    No intake or output data in the 24 hours ending 10/11/18 2104    Physical Exam   Psychiatric:   Mental Status Exam:  Appearance: unremarkable, age appropriate, normal weight, lying in bed  Level of Consciousness: awake and alert  Behavior/Cooperation: friendly and cooperative  Psychomotor: unremarkable   Speech: normal tone, normal rate, " "normal pitch, normal volume  Language: english, fluid  Orientation: person, place, situation, time/date, day of week  Attention Span/Concentration: spelled "HOUSE" forwards and backwards  Memory: Registers and recalls 3/3 objects at 1 and 5 minutes  Mood: "relaxed"  Affect: euthymic and mood-congruent  Thought Process: linear, goal-directed  Associations: normal and logical  Thought Content: endorses passive SI  Fund of Knowledge: Aware of current events  Abstraction: similarities were abstract  Insight: fair  Judgment: fair          Significant Labs:   Last 24 Hours:   Recent Lab Results       10/11/18  1727      Immature Granulocytes 0.4     Immature Grans (Abs) 0.06  Comment:  Mild elevation in immature granulocytes is non specific and   can be seen in a variety of conditions including stress response,   acute inflammation, trauma and pregnancy. Correlation with other   laboratory and clinical findings is essential.       Acetaminophen (Tylenol), Serum <3.0  Comment:  Toxic Levels:  Adults (4 hr post-ingestion).........>150 ug/mL  Adults (12 hr post-ingestion)........>40 ug/mL  Peds (2 hr post-ingestion, liquid)...>225 ug/mL       Albumin 3.6     Alcohol, Medical, Serum <10     Alkaline Phosphatase 116     ALT 10     Anion Gap 13     AST 13     Baso # 0.06     Basophil% 0.4     Total Bilirubin 0.5  Comment:  For infants and newborns, interpretation of results should be based  on gestational age, weight and in agreement with clinical  observations.  Premature Infant recommended reference ranges:  Up to 24 hours.............<8.0 mg/dL  Up to 48 hours............<12.0 mg/dL  3-5 days..................<15.0 mg/dL  6-29 days.................<15.0 mg/dL       BUN, Bld 8     Calcium 9.5     Chloride 105     CO2 21     Creatinine 1.3     Differential Method Automated     eGFR if African American >60.0     eGFR if non  >60.0  Comment:  Calculation used to obtain the estimated glomerular filtration  rate " (eGFR) is the CKD-EPI equation.        Eos # 0.1     Eosinophil% 0.9     Glucose 123     Gran # (ANC) 7.8     Gran% 56.2     Hematocrit 54.6     Hemoglobin 17.7     Lithium Lvl <0.1     Lymph # 4.9     Lymph% 35.2     MCH 31.7     MCHC 32.4     MCV 98     Mono # 1.0     Mono% 6.9     MPV 9.6     nRBC 0     Platelets 429     Potassium 4.0     Total Protein 7.3     RBC 5.58     RDW 13.8     Salicylate Lvl <5.0  Comment:  Toxic:  30.0 - 70.0 mg/dl  Lethal: >70.0 mg/dl       Sodium 139     TSH 1.158     WBC 13.96           Significant Imaging: I have reviewed all pertinent imaging results/findings within the past 24 hours.    Assessment/Plan:     Suicidal ideations    Pt presents on recommendations from his PCP for depression and SI in the setting of separation from his wife and children.  Pt reports worsening depression with multiple neurovegitative symptoms, endorses regular thoughts of suicide (including during interview), denies plan.  Pt denies prior psychiatric hospitalizations, prior diagnoses, prior suicide attempts.  Pt has no social supports and limited coping skills other than regular substance use.  Pt motivated to pursue treatment for his depression but is resistant to psychiatric admission.  Suspect substance use may be a strong contributor to pt's symptoms.  R/o feigning/exaggerating symptoms for secondary gain although no obvious goal identified on interview.      Recommendations:   - Recommend to enact PEC for imminent danger to self and grave disability due to acute psychiatric illness  - Recommend to seek involuntary inpatient hospitalization  - Recommend against initiation of scheduled or PRN psychotropic medications at this time, defer to inpatient provider  - Please keep sitter in room until pt transferred  - Medical management per ED MD, defer non-psychiatric medications  - Currently medically cleared    Discussed case with ED MD and psychiatry staff on call Dr. Bell.           Total Time:  45  payton Parmar MD   Psychiatry  [facility]    This encounter was reviewed by me and case was discussed with the resident physician above . I agree with the assessment and  treatment plan as stated .

## 2018-10-12 NOTE — ED NOTES
Pt being rude, yelling, using profanity, and refusing to move rooms; charge nurse aware and okayed patient staying in [facility]

## 2018-10-12 NOTE — CONSULTS
Please refer to my consult note from 10/11 at 9:15 PM.    Jonh Parmar MD  Psychiatry PGY-2  [facility]

## 2018-10-12 NOTE — ED NOTES
::::::::::::::::::::Family to contact::::::::::::::::::::::::::    Kenji House - [phone]    Saritha Benson - [phone]    ^sisters

## 2018-10-12 NOTE — ED NOTES
MD and Charge nurse made aware of need for urine; pt states he does not need to urinate at this moment

## 2018-10-12 NOTE — HPI
"Per Primary MD:  PATIENT WENT TO NP TODAY AND WAS DIAGNOSED WITH CLINICAL DEPRESSION.   WIFE STATES THAT PT WAS CRYING WHILE IN IM CLINIC.   DENIES SI/HI.     Per CCLAYTON LYONS:   Sami Benson is a 47 y.o. male with no reportd past psychiatric history, currently presenting with <principal problem not specified>.  Psychiatry was originally consulted to address the patient's symptoms of suicidal ideation, substance use, and depression.     Pt presents to [facility] ED on recommendation from his primary care provider for evaluation of depression and suicidal ideation.  Pt reports that he has been having difficulty with depression since being kicked out of the residence her formerly shared with his wife and 2 children.  He reports that he was kicked out due to his substance use (alcohol, cocaine).  He reports that he has been living in a house alone over the past 11 months.  He notes that he initially was motivated to abstain from substance use and change his behavior so that his wife would allow him to return.  He reports period of sobriety for 10 months after eviction but says that, 1.5 months ago, was frustrated about his wife not allowing him to return and says "I chose to start drinking again."  He reports worsening depression over th past month associated with difficulty with sleep, energy, appetite, and concentration.  He reports hopelessness about the potential to modify his current situation.  He endorses thinking about suicide "regularly," denies ever thinking of a plan.  He reports that he has limited social supports.  He notes no coping skills other than substance use.  He denies that he currently takes any scheduled medications, denies follow-up with mental health provider, denies history of hospitalizations, and denies history of suicide attempts. He says that he needs assistance with "getting on some medications," not amenable to admission at this time.    Collateral:   Attempted to call pt's wife " "(Krissy [phone]), no answer, unable to leave voicemail.    SUBJECTIVE   Currently, the patient is endorsing the following:    Medical Review Of Systems:  All systems reviewed and are negative except as above noted in HPI and for hip pain.    Psychiatric Review Of Systems - Is patient experiencing or having changes in:  sleep: yes  appetite: yes  weight: no  energy/anergy: yes  interest/pleasure/anhedonia: yes  somatic symptoms: no  guilty/hopelessness: yes  concentration: yes  S.I.B.s/risky behavior: no  SI/SA:  nyes    anxiety/panic: yes  Agoraphobia:  no  Social phobia:  no  Recurrent nightmares:  no  hyper startle response:  no  Avoidance: no  Recurrent thoughts:  no  Recurrent behaviors:  no    Irritability: no  Racing thoughts: no  Impulsive behaviors: no  Pressured speech:  no    Paranoia:no  Delusions: no  AVH:no    Past Medical/Surgical History:  Past Medical History:   Diagnosis Date    Obesity (BMI 30-39.9) 3/21/2017    Tobacco use 3/21/2017      has a past medical history of Obesity (BMI 30-39.9) (3/21/2017) and Tobacco use (3/21/2017).  No past surgical history on file.    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[employer], unable to maintain regular employment  Education: college graduate  Special Ed: no  Housing Status: lives alone in house  History of phys/sexual abuse: no  Access to gun: no    Substance Abuse History:  Recreational Drugs: cocaine regularly ("a few times a week"), endorses trying "everything but heroin"  Use of Alcohol: heavy, denies history of withdrawals, denies current withdrawal symptoms  Rehab History:yes, 3yrs ago at [facility], 10 months ago at [facility]   Tobacco Use:yes, 1ppd  Legal consequences of chemical use: no  Is the " patient aware of the biomedical complications associated with substance abuse and mental illness? no    Legal History:  Past Charges/Incarcerations:yes, arrested in college for public intoxication and interfering with police investigation  Pending charges:no     Family Psychiatric History:   Uncle- prolonged psychiatric hospitalization for unknown reason    Psychosocial Stressors: marital and drug and alcohol.   Functioning Relationships: alone & isolated    Psychosocial Factors:  Maladaptive or problem behaviors: substance use (cocaine and alcohol)  Peer group, social, ethic, cultural, emotional, and health factors: alone and isolated from social supports  Living situation, family constellation, family circumstances/home: living alone in a house,  from his wife and children  Recovery environment: home  Community resources used by patient: two prior rehab stays, none currently  Treatment acceptance/motivation for change: motivated

## 2018-10-12 NOTE — ED NOTES
Sister at bedside visiting pt; sister updated on pt's status and educated on PEC purpose as well as process

## 2018-10-12 NOTE — ASSESSMENT & PLAN NOTE
Pt presents on recommendations from his PCP for depression and SI in the setting of separation from his wife and children.  Pt reports worsening depression with multiple neurovegitative symptoms, endorses regular thoughts of suicide (including during interview), denies plan.  Pt denies prior psychiatric hospitalizations, prior diagnoses, prior suicide attempts.  Pt has no social supports and limited coping skills other than regular substance use.  Pt motivated to pursue treatment for his depression but is resistant to psychiatric admission.  Suspect substance use may be a strong contributor to pt's symptoms.  R/o feigning/exaggerating symptoms for secondary gain although no obvious goal identified on interview.      Recommendations:   - Recommend to enact PEC for imminent danger to self and grave disability due to acute psychiatric illness  - Recommend to seek involuntary inpatient hospitalization  - Recommend against initiation of scheduled or PRN psychotropic medications at this time, defer to inpatient provider  - Please keep sitter in room until pt transferred  - Medical management per ED MD, defer non-psychiatric medications  - Currently medically cleared    Discussed case with ED MD and psychiatry staff on call Dr. Bell.
